# Patient Record
Sex: MALE | Race: WHITE | NOT HISPANIC OR LATINO | Employment: OTHER | URBAN - METROPOLITAN AREA
[De-identification: names, ages, dates, MRNs, and addresses within clinical notes are randomized per-mention and may not be internally consistent; named-entity substitution may affect disease eponyms.]

---

## 2024-03-12 ENCOUNTER — OFFICE VISIT (OUTPATIENT)
Dept: CARDIOLOGY | Facility: CLINIC | Age: 64
End: 2024-03-12
Payer: MEDICARE

## 2024-03-12 VITALS
DIASTOLIC BLOOD PRESSURE: 95 MMHG | TEMPERATURE: 96.9 F | HEIGHT: 72 IN | SYSTOLIC BLOOD PRESSURE: 161 MMHG | HEART RATE: 96 BPM | WEIGHT: 247.3 LBS | BODY MASS INDEX: 33.49 KG/M2

## 2024-03-12 DIAGNOSIS — I10 ESSENTIAL HYPERTENSION: ICD-10-CM

## 2024-03-12 DIAGNOSIS — E78.00 HYPERCHOLESTEREMIA: Primary | ICD-10-CM

## 2024-03-12 DIAGNOSIS — I25.10 CORONARY ARTERY DISEASE INVOLVING NATIVE CORONARY ARTERY OF NATIVE HEART WITHOUT ANGINA PECTORIS: ICD-10-CM

## 2024-03-12 PROCEDURE — 3080F DIAST BP >= 90 MM HG: CPT | Performed by: INTERNAL MEDICINE

## 2024-03-12 PROCEDURE — 99213 OFFICE O/P EST LOW 20 MIN: CPT | Performed by: INTERNAL MEDICINE

## 2024-03-12 PROCEDURE — 1036F TOBACCO NON-USER: CPT | Performed by: INTERNAL MEDICINE

## 2024-03-12 PROCEDURE — 3077F SYST BP >= 140 MM HG: CPT | Performed by: INTERNAL MEDICINE

## 2024-03-12 PROCEDURE — 1159F MED LIST DOCD IN RCRD: CPT | Performed by: INTERNAL MEDICINE

## 2024-03-12 PROCEDURE — 1160F RVW MEDS BY RX/DR IN RCRD: CPT | Performed by: INTERNAL MEDICINE

## 2024-03-12 RX ORDER — FLUTICASONE PROPIONATE 50 MCG
1 SPRAY, SUSPENSION (ML) NASAL
COMMUNITY
Start: 2016-07-11

## 2024-03-12 RX ORDER — ROSUVASTATIN CALCIUM 40 MG/1
TABLET, COATED ORAL
COMMUNITY
Start: 2020-09-11

## 2024-03-12 RX ORDER — ASPIRIN 81 MG/1
1 TABLET ORAL DAILY
COMMUNITY
Start: 2021-08-26

## 2024-03-12 RX ORDER — LOSARTAN POTASSIUM 25 MG/1
1 TABLET ORAL
COMMUNITY
Start: 2016-04-22 | End: 2024-03-12 | Stop reason: WASHOUT

## 2024-03-12 RX ORDER — ACETAMINOPHEN 325 MG/1
TABLET ORAL EVERY 6 HOURS
COMMUNITY
Start: 2021-08-26

## 2024-03-12 RX ORDER — FLUTICASONE PROPIONATE AND SALMETEROL 100; 50 UG/1; UG/1
POWDER RESPIRATORY (INHALATION)
COMMUNITY
Start: 2021-08-26

## 2024-03-12 RX ORDER — ALBUTEROL SULFATE 90 UG/1
AEROSOL, METERED RESPIRATORY (INHALATION)
COMMUNITY

## 2024-03-12 RX ORDER — LOSARTAN POTASSIUM 50 MG/1
50 TABLET ORAL DAILY
Qty: 30 TABLET | Refills: 11 | Status: SHIPPED | OUTPATIENT
Start: 2024-03-12 | End: 2024-05-28 | Stop reason: SDUPTHER

## 2024-03-12 NOTE — PROGRESS NOTES
Chief Complaint:   Coronary Artery Disease     History of Present Illness     Al Christine is a 63 y.o. male presenting with coronary artery disease.  He had a history of angina and cath with mild CAD managed medically.  The patient is tolerating guideline-directed medical therapy with antiplatelet and statin medication and is compliant.  The patient is active regularly and follows a heart healthy diet.  The patient has been well and is not having any anginal symptoms or dyspnea on exertion.      Review of Systems  All pertinent systems have been reviewed and are negative except for what is stated in the history of present illness.    All other systems have been reviewed and are negative and noncontributory to this patient's current ailments.     .       Previous History     Past Medical History:  He has a past medical history of Coronary artery disease involving native coronary artery of native heart without angina pectoris (03/12/2024), Essential hypertension (03/12/2024), Hypercholesteremia (03/12/2024), and Other specified health status.    Past Surgical History:  He has no past surgical history on file.      Social History:  He reports that he has quit smoking. His smoking use included cigarettes. He has never used smokeless tobacco. He reports current alcohol use. He reports that he does not use drugs.    Family History:  No family history on file.     Allergies:  Cat dander and Weed pollen    Outpatient Medications:  Current Outpatient Medications   Medication Instructions    acetaminophen (Tylenol) 325 mg tablet oral, Every 6 hours    albuterol 90 mcg/actuation inhaler inhale 2 puffs by mouth and INTO THE LUNGS every 4 hours if neede...  (REFER TO PRESCRIPTION NOTES).    aspirin 81 mg EC tablet 1 tablet, oral, Daily    fluticasone (Flonase) 50 mcg/actuation nasal spray 1 spray, Does not apply, Daily RT    fluticasone propion-salmeteroL (Advair Diskus) 100-50 mcg/dose diskus inhaler inhalation    losartan  (Cozaar) 25 mg tablet 1 tablet, oral, Daily RT    rosuvastatin (Crestor) 40 mg tablet oral       Physical Examination   Vitals:  Visit Vitals  BP (!) 161/95   Pulse 96   Temp 36.1 °C (96.9 °F)   Ht 1.829 m (6')   Wt 112 kg (247 lb 4.8 oz)   BMI 33.54 kg/m²   Smoking Status Former   BSA 2.39 m²      Physical Exam  Vitals reviewed.   Constitutional:       General: He is not in acute distress.     Appearance: Normal appearance.   HENT:      Head: Normocephalic and atraumatic.      Nose: Nose normal.   Eyes:      Conjunctiva/sclera: Conjunctivae normal.   Cardiovascular:      Rate and Rhythm: Normal rate and regular rhythm.      Pulses: Normal pulses.      Heart sounds: No murmur heard.  Pulmonary:      Effort: Pulmonary effort is normal. No respiratory distress.      Breath sounds: Normal breath sounds. No wheezing, rhonchi or rales.   Abdominal:      General: Bowel sounds are normal. There is no distension.      Palpations: Abdomen is soft.      Tenderness: There is no abdominal tenderness.   Musculoskeletal:         General: No swelling.      Right lower leg: No edema.      Left lower leg: No edema.   Skin:     General: Skin is warm and dry.      Capillary Refill: Capillary refill takes less than 2 seconds.   Neurological:      General: No focal deficit present.      Mental Status: He is alert.   Psychiatric:         Mood and Affect: Mood normal.            Labs/Imaging/Cardiac Studies     Last Labs:  CBC -  Lab Results   Component Value Date    WBC 6.1 03/30/2022    HGB 15.9 03/30/2022    HCT 47.2 03/30/2022    MCV 92 03/30/2022     03/30/2022       CMP -  Lab Results   Component Value Date    CALCIUM 9.0 06/17/2022    PROT 7.1 03/30/2022    ALBUMIN 4.6 03/30/2022    AST 16 03/30/2022    ALT 25 03/30/2022    ALKPHOS 57 03/30/2022    BILITOT 0.7 03/30/2022       LIPID PANEL -   Lab Results   Component Value Date    CHOL 189 03/30/2022    HDL 60.6 03/30/2022    CHHDL 3.1 03/30/2022    VLDL 23 03/30/2022    TRIG  "115 03/30/2022       RENAL FUNCTION PANEL -   Lab Results   Component Value Date    K 4.5 06/17/2022       No results found for: \"BNP\", \"HGBA1C\"    ECG:    Echo:  No echocardiogram results found for the past 12 months       Assessment and Recommendations     Assessment/Plan     1. Hypercholesteremia  LDL at goal for mild CAD.    2. Coronary artery disease involving native coronary artery of native heart without angina pectoris  CAD: The patient's CAD, as detailed in the HPI, has been clinically stable, without any anginal symptoms or dyspnea.  The patient will continue treatment with guideline-directed medical therapy with antiplatelet and statin medications.    3. Essential hypertension  Not well controlled.  Increase ARB.       Darek Pettit MD    Exclusive of any other services or procedures performed, I, Darek Pettit MD , spent 30 minutes in duration for this visit today.  This time consisted of chart review, obtaining history, and/or performing the exam as documented above as well as documenting the clinical information for the encounter in the electronic record, discussing treatment options, plans, and/or goals with patient, family, and/or caregiver, refilling medications, updating the electronic record, ordering medicines, lab work, imaging, referrals, and/or procedures as documented above and communicating with other [facility] professionals. I have discussed the results of laboratory, radiology, and cardiology studies with the patient and their family/caregiver.    "

## 2024-05-28 DIAGNOSIS — I25.10 CORONARY ARTERY DISEASE INVOLVING NATIVE CORONARY ARTERY OF NATIVE HEART WITHOUT ANGINA PECTORIS: ICD-10-CM

## 2024-05-28 DIAGNOSIS — E78.00 HYPERCHOLESTEREMIA: ICD-10-CM

## 2024-05-28 DIAGNOSIS — I10 ESSENTIAL HYPERTENSION: ICD-10-CM

## 2024-05-28 RX ORDER — LOSARTAN POTASSIUM 50 MG/1
50 TABLET ORAL DAILY
Qty: 90 TABLET | Refills: 2 | Status: SHIPPED | OUTPATIENT
Start: 2024-05-28 | End: 2025-05-28

## 2024-12-08 ENCOUNTER — APPOINTMENT (OUTPATIENT)
Dept: RADIOLOGY | Facility: HOSPITAL | Age: 64
End: 2024-12-08
Payer: MEDICARE

## 2024-12-08 ENCOUNTER — HOSPITAL ENCOUNTER (EMERGENCY)
Facility: HOSPITAL | Age: 64
Discharge: HOME | End: 2024-12-08
Payer: MEDICARE

## 2024-12-08 VITALS
WEIGHT: 230 LBS | OXYGEN SATURATION: 98 % | DIASTOLIC BLOOD PRESSURE: 68 MMHG | RESPIRATION RATE: 20 BRPM | TEMPERATURE: 98.6 F | BODY MASS INDEX: 31.15 KG/M2 | HEART RATE: 78 BPM | HEIGHT: 72 IN | SYSTOLIC BLOOD PRESSURE: 140 MMHG

## 2024-12-08 DIAGNOSIS — J01.10 ACUTE FRONTAL SINUSITIS, RECURRENCE NOT SPECIFIED: Primary | ICD-10-CM

## 2024-12-08 DIAGNOSIS — Q39.6 ESOPHAGEAL DIVERTICULUM: ICD-10-CM

## 2024-12-08 LAB
ALBUMIN SERPL BCP-MCNC: 4 G/DL (ref 3.4–5)
ALP SERPL-CCNC: 61 U/L (ref 33–136)
ALT SERPL W P-5'-P-CCNC: 13 U/L (ref 10–52)
ANION GAP SERPL CALC-SCNC: 12 MMOL/L (ref 10–20)
APPEARANCE UR: CLEAR
AST SERPL W P-5'-P-CCNC: 9 U/L (ref 9–39)
BASOPHILS # BLD AUTO: 0.04 X10*3/UL (ref 0–0.1)
BASOPHILS NFR BLD AUTO: 0.2 %
BILIRUB SERPL-MCNC: 1 MG/DL (ref 0–1.2)
BILIRUB UR STRIP.AUTO-MCNC: NEGATIVE MG/DL
BUN SERPL-MCNC: 15 MG/DL (ref 6–23)
CALCIUM SERPL-MCNC: 9.5 MG/DL (ref 8.6–10.3)
CHLORIDE SERPL-SCNC: 102 MMOL/L (ref 98–107)
CO2 SERPL-SCNC: 24 MMOL/L (ref 21–32)
COLOR UR: YELLOW
CREAT SERPL-MCNC: 0.84 MG/DL (ref 0.5–1.3)
CRP SERPL-MCNC: 17.13 MG/DL
EGFRCR SERPLBLD CKD-EPI 2021: >90 ML/MIN/1.73M*2
EOSINOPHIL # BLD AUTO: 0.01 X10*3/UL (ref 0–0.7)
EOSINOPHIL NFR BLD AUTO: 0.1 %
ERYTHROCYTE [DISTWIDTH] IN BLOOD BY AUTOMATED COUNT: 12.4 % (ref 11.5–14.5)
ERYTHROCYTE [SEDIMENTATION RATE] IN BLOOD BY WESTERGREN METHOD: 90 MM/H (ref 0–20)
FLUAV RNA RESP QL NAA+PROBE: NOT DETECTED
FLUBV RNA RESP QL NAA+PROBE: NOT DETECTED
GLUCOSE SERPL-MCNC: 119 MG/DL (ref 74–99)
GLUCOSE UR STRIP.AUTO-MCNC: NORMAL MG/DL
HCT VFR BLD AUTO: 46.1 % (ref 41–52)
HGB BLD-MCNC: 15.5 G/DL (ref 13.5–17.5)
HOLD SPECIMEN: NORMAL
IMM GRANULOCYTES # BLD AUTO: 0.09 X10*3/UL (ref 0–0.7)
IMM GRANULOCYTES NFR BLD AUTO: 0.5 % (ref 0–0.9)
KETONES UR STRIP.AUTO-MCNC: ABNORMAL MG/DL
LACTATE SERPL-SCNC: 0.6 MMOL/L (ref 0.4–2)
LEUKOCYTE ESTERASE UR QL STRIP.AUTO: NEGATIVE
LYMPHOCYTES # BLD AUTO: 0.81 X10*3/UL (ref 1.2–4.8)
LYMPHOCYTES NFR BLD AUTO: 4.8 %
MCH RBC QN AUTO: 31 PG (ref 26–34)
MCHC RBC AUTO-ENTMCNC: 33.6 G/DL (ref 32–36)
MCV RBC AUTO: 92 FL (ref 80–100)
MONOCYTES # BLD AUTO: 1.99 X10*3/UL (ref 0.1–1)
MONOCYTES NFR BLD AUTO: 11.8 %
MUCOUS THREADS #/AREA URNS AUTO: ABNORMAL /LPF
NEUTROPHILS # BLD AUTO: 13.9 X10*3/UL (ref 1.2–7.7)
NEUTROPHILS NFR BLD AUTO: 82.6 %
NITRITE UR QL STRIP.AUTO: NEGATIVE
NRBC BLD-RTO: 0 /100 WBCS (ref 0–0)
PH UR STRIP.AUTO: 6 [PH]
PLATELET # BLD AUTO: 229 X10*3/UL (ref 150–450)
POTASSIUM SERPL-SCNC: 4.1 MMOL/L (ref 3.5–5.3)
PROT SERPL-MCNC: 7.3 G/DL (ref 6.4–8.2)
PROT UR STRIP.AUTO-MCNC: ABNORMAL MG/DL
RBC # BLD AUTO: 5 X10*6/UL (ref 4.5–5.9)
RBC # UR STRIP.AUTO: NEGATIVE /UL
RBC #/AREA URNS AUTO: ABNORMAL /HPF
S PYO DNA THROAT QL NAA+PROBE: NOT DETECTED
SARS-COV-2 RNA RESP QL NAA+PROBE: NOT DETECTED
SODIUM SERPL-SCNC: 134 MMOL/L (ref 136–145)
SP GR UR STRIP.AUTO: >1.05
UROBILINOGEN UR STRIP.AUTO-MCNC: NORMAL MG/DL
WBC # BLD AUTO: 16.8 X10*3/UL (ref 4.4–11.3)
WBC #/AREA URNS AUTO: ABNORMAL /HPF

## 2024-12-08 PROCEDURE — 36415 COLL VENOUS BLD VENIPUNCTURE: CPT | Performed by: PHYSICIAN ASSISTANT

## 2024-12-08 PROCEDURE — 99285 EMERGENCY DEPT VISIT HI MDM: CPT | Mod: 25

## 2024-12-08 PROCEDURE — 71046 X-RAY EXAM CHEST 2 VIEWS: CPT

## 2024-12-08 PROCEDURE — 2550000001 HC RX 255 CONTRASTS: Performed by: PHYSICIAN ASSISTANT

## 2024-12-08 PROCEDURE — 2500000001 HC RX 250 WO HCPCS SELF ADMINISTERED DRUGS (ALT 637 FOR MEDICARE OP): Performed by: PHYSICIAN ASSISTANT

## 2024-12-08 PROCEDURE — 2500000004 HC RX 250 GENERAL PHARMACY W/ HCPCS (ALT 636 FOR OP/ED): Performed by: PHYSICIAN ASSISTANT

## 2024-12-08 PROCEDURE — 86140 C-REACTIVE PROTEIN: CPT | Performed by: PHYSICIAN ASSISTANT

## 2024-12-08 PROCEDURE — 71260 CT THORAX DX C+: CPT | Performed by: RADIOLOGY

## 2024-12-08 PROCEDURE — 96374 THER/PROPH/DIAG INJ IV PUSH: CPT

## 2024-12-08 PROCEDURE — 81001 URINALYSIS AUTO W/SCOPE: CPT | Performed by: PHYSICIAN ASSISTANT

## 2024-12-08 PROCEDURE — 85025 COMPLETE CBC W/AUTO DIFF WBC: CPT | Performed by: PHYSICIAN ASSISTANT

## 2024-12-08 PROCEDURE — 87636 SARSCOV2 & INF A&B AMP PRB: CPT | Performed by: PHYSICIAN ASSISTANT

## 2024-12-08 PROCEDURE — 71260 CT THORAX DX C+: CPT

## 2024-12-08 PROCEDURE — 80053 COMPREHEN METABOLIC PANEL: CPT | Performed by: PHYSICIAN ASSISTANT

## 2024-12-08 PROCEDURE — 71046 X-RAY EXAM CHEST 2 VIEWS: CPT | Performed by: RADIOLOGY

## 2024-12-08 PROCEDURE — 83605 ASSAY OF LACTIC ACID: CPT | Performed by: PHYSICIAN ASSISTANT

## 2024-12-08 PROCEDURE — 85652 RBC SED RATE AUTOMATED: CPT | Performed by: PHYSICIAN ASSISTANT

## 2024-12-08 PROCEDURE — 87651 STREP A DNA AMP PROBE: CPT | Performed by: PHYSICIAN ASSISTANT

## 2024-12-08 RX ORDER — MORPHINE SULFATE 2 MG/ML
2 INJECTION, SOLUTION INTRAMUSCULAR; INTRAVENOUS ONCE
Status: COMPLETED | OUTPATIENT
Start: 2024-12-08 | End: 2024-12-08

## 2024-12-08 RX ORDER — IBUPROFEN 400 MG/1
400 TABLET ORAL ONCE
Status: COMPLETED | OUTPATIENT
Start: 2024-12-08 | End: 2024-12-08

## 2024-12-08 RX ORDER — AMOXICILLIN AND CLAVULANATE POTASSIUM 875; 125 MG/1; MG/1
1 TABLET, FILM COATED ORAL ONCE
Status: COMPLETED | OUTPATIENT
Start: 2024-12-08 | End: 2024-12-08

## 2024-12-08 RX ORDER — ACETAMINOPHEN 325 MG/1
975 TABLET ORAL ONCE
Status: COMPLETED | OUTPATIENT
Start: 2024-12-08 | End: 2024-12-08

## 2024-12-08 RX ORDER — AMOXICILLIN AND CLAVULANATE POTASSIUM 875; 125 MG/1; MG/1
1 TABLET, FILM COATED ORAL EVERY 12 HOURS
Qty: 20 TABLET | Refills: 0 | Status: SHIPPED | OUTPATIENT
Start: 2024-12-08 | End: 2024-12-18

## 2024-12-08 ASSESSMENT — PAIN SCALES - GENERAL
PAINLEVEL_OUTOF10: 1
PAINLEVEL_OUTOF10: 4
PAINLEVEL_OUTOF10: 1
PAINLEVEL_OUTOF10: 8
PAINLEVEL_OUTOF10: 1

## 2024-12-08 ASSESSMENT — PAIN - FUNCTIONAL ASSESSMENT
PAIN_FUNCTIONAL_ASSESSMENT: 0-10

## 2024-12-08 ASSESSMENT — PAIN DESCRIPTION - PAIN TYPE: TYPE: ACUTE PAIN

## 2024-12-08 ASSESSMENT — COLUMBIA-SUICIDE SEVERITY RATING SCALE - C-SSRS
6. HAVE YOU EVER DONE ANYTHING, STARTED TO DO ANYTHING, OR PREPARED TO DO ANYTHING TO END YOUR LIFE?: NO
2. HAVE YOU ACTUALLY HAD ANY THOUGHTS OF KILLING YOURSELF?: NO
1. IN THE PAST MONTH, HAVE YOU WISHED YOU WERE DEAD OR WISHED YOU COULD GO TO SLEEP AND NOT WAKE UP?: NO

## 2024-12-08 ASSESSMENT — LIFESTYLE VARIABLES
HAVE PEOPLE ANNOYED YOU BY CRITICIZING YOUR DRINKING: NO
EVER HAD A DRINK FIRST THING IN THE MORNING TO STEADY YOUR NERVES TO GET RID OF A HANGOVER: NO
TOTAL SCORE: 0
HAVE YOU EVER FELT YOU SHOULD CUT DOWN ON YOUR DRINKING: NO
EVER FELT BAD OR GUILTY ABOUT YOUR DRINKING: NO

## 2024-12-08 ASSESSMENT — PAIN DESCRIPTION - LOCATION: LOCATION: CHEST

## 2024-12-08 NOTE — ED PROVIDER NOTES
HPI   Chief Complaint   Patient presents with    Flu Symptoms     Cough, body aches, fever, headaches for 6 days       History of present illness: 64-year-old male with history of coronary artery disease, hypertension, hypercholesterolemia complains of cough congestion and fever for the last 6 days.  Cough is occasionally productive of green sputum.  Has a headache in his frontal region that is associated.  Also complains of sore throat.  Painful to swallow but he is debility doing so.  Currently says his pain is 4 out of 10.  Pain is mostly in his throat and head but also has general myalgias.  He took ibuprofen with last night which offered some relief.  He is also taking some cough medicine.  Denies chest pain shortness of breath abdominal pain diarrhea constipation dysuria polyuria paresthesias incontinence difficulty ambulating.    Review of systems: Constitutional, eye, ENT, cardiovascular, respiratory, gastrointestinal, genitourinary, neurologic, musculoskeletal, dermatologic, hematologic, endocrine systems were evaluated and were negative unless otherwise specified in history of present illness.    Medications: Reviewed and per nursing note.    Family history: Denies relevant medical conditions.        Physical exam:    Appearance: Well-developed, well-nourished, nontoxic-appearing, alert and oriented x3. Talking in complete sentences.    HEENT: Inflamed nasal turbinates with rhinorrhea.  Frontal sinus tenderness.  Tonsillar erythema with no edema or exudates.  Uvula midline with no stridor drooling or trismus.  No induration of floor of mouth.  Head normocephalic atraumatic, extraocular movements intact, pupils equal round reactive to light, mucous membranes are moist and pink.  Partial cerumen impaction bilateral with normal tympanic membrane from visualized portion.    NECK:  Nml Inspection, no meningismus, no thyromegaly, no lymphadenopathy, no JVD, trachea is midline.    Respiratory: Clear to  auscultation bilaterally with normal bilateral excursion. No wheezes, rhonchi, crackles.    Cardiovascular: Regular rate and rhythm, no murmurs, rubs or gallops. Pulses 2+ symmetrically in the dorsalis pedis and radial pulses.    Abdomen/GI:  Soft, nontender, nondistended, normal bowel sounds x4. No masses or organomegaly.    :  No CVA tenderness    Neuro:  Oriented x 3, Speech Clear, cranial nerves grossly intact. Normal sensation to light touch in all 4 extremities.    Musculoskeletal: Patient spontaneously moves all 4 extremities.    Skin:  No cyanosis, clubbing, edema, open wounds, or rashes.            Patient History   Past Medical History:   Diagnosis Date    Coronary artery disease involving native coronary artery of native heart without angina pectoris 03/12/2024    Mild CAD by [facility] ([place] 2021)    Essential hypertension 03/12/2024    Hypercholesteremia 03/12/2024    Other specified health status     No pertinent past medical history     No past surgical history on file.  No family history on file.  Social History     Tobacco Use    Smoking status: Former     Types: Cigarettes    Smokeless tobacco: Never   Substance Use Topics    Alcohol use: Yes     Comment: rarely    Drug use: Never       Physical Exam   ED Triage Vitals [12/08/24 0756]   Temperature Heart Rate Respirations BP   36.6 °C (97.8 °F) 96 18 137/87      SpO2 Temp Source Heart Rate Source Patient Position   -- Temporal -- --      BP Location FiO2 (%)     -- --       Physical Exam      ED Course & MDM   Diagnoses as of 12/08/24 1312   Acute frontal sinusitis, recurrence not specified   Esophageal diverticulum                 No data recorded     Steve Coma Scale Score: 15 (12/08/24 0805 : Aydee Monique RN)                           Medical Decision Making  Labs Reviewed  CBC WITH AUTO DIFFERENTIAL - Abnormal     WBC                           16.8 (*)               nRBC                          0.0                    RBC                            5.00                   Hemoglobin                    15.5                   Hematocrit                    46.1                   MCV                           92                     MCH                           31.0                   MCHC                          33.6                   RDW                           12.4                   Platelets                     229                    Neutrophils %                 82.6                   Immature Granulocytes %, Automated   0.5                    Lymphocytes %                 4.8                    Monocytes %                   11.8                   Eosinophils %                 0.1                    Basophils %                   0.2                    Neutrophils Absolute          13.90 (*)               Immature Granulocytes Absolute, Au*   0.09                   Lymphocytes Absolute          0.81 (*)               Monocytes Absolute            1.99 (*)               Eosinophils Absolute          0.01                   Basophils Absolute            0.04                COMPREHENSIVE METABOLIC PANEL - Abnormal     Glucose                       119 (*)                Sodium                        134 (*)                Potassium                     4.1                    Chloride                      102                    Bicarbonate                   24                     Anion Gap                     12                     Urea Nitrogen                 15                     Creatinine                    0.84                   eGFR                          >90                    Calcium                       9.5                    Albumin                       4.0                    Alkaline Phosphatase          61                     Total Protein                 7.3                    AST                           9                      Bilirubin, Total              1.0                    ALT                           13                  C-REACTIVE  PROTEIN - Abnormal     C-Reactive Protein            17.13 (*)            SEDIMENTATION RATE, AUTOMATED - Abnormal     Sedimentation Rate            90 (*)              URINALYSIS WITH REFLEX CULTURE AND MICROSCOPIC - Abnormal     Color, Urine                  Yellow                 Appearance, Urine             Clear                  Specific Gravity, Urine       >1.050 (*)               pH, Urine                     6.0                    Protein, Urine                30 (1+) (*)               Glucose, Urine                Normal                 Blood, Urine                  NEGATIVE                Ketones, Urine                TRACE (*)               Bilirubin, Urine              NEGATIVE                Urobilinogen, Urine           Normal                 Nitrite, Urine                NEGATIVE                Leukocyte Esterase, Urine     NEGATIVE             URINALYSIS MICROSCOPIC WITH REFLEX CULTURE - Abnormal     WBC, Urine                    1-5                    RBC, Urine                    6-10 (*)               Mucus, Urine                  4+                  GROUP A STREPTOCOCCUS, PCR - Normal     Group A Strep PCR                                 INFLUENZA A AND B PCR - Normal     Flu A Result                                         Flu B Result                                             Narrative: This assay is an in vitro diagnostic multiplex nucleic acid amplification test for the detection and discrimination of Influenza A & B from nasopharyngeal specimens, and has been validated for use at [facility]. Negative results do not preclude Influenza A/B infections, and should not be used as the sole basis for diagnosis, treatment, or other management decisions. If Influenza A/B and RSV PCR results are negative, testing for Parainfluenza virus, Adenovirus and Metapneumovirus is routinely performed for [facility] [place] and [place] inpatients, and is available on other  patients by placing an add-on request.  SARS-COV-2 PCR - Normal     Coronavirus 2019, PCR                                    Narrative: This assay has received FDA Emergency Use Authorization (EUA) and is only authorized for the duration of time that circumstances exist to justify the authorization of the emergency use of in vitro diagnostic tests for the detection of SARS-CoV-2 virus and/or diagnosis of COVID-19 infection under section 564(b)(1) of the Act, 21 U.S.C. 360bbb-3(b)(1). This assay is an in vitro diagnostic nucleic acid amplification test for the qualitative detection of SARS-CoV-2 from nasopharyngeal specimens and has been validated for use at [facility]. Negative results do not preclude COVID-19 infections and should not be used as the sole basis for diagnosis, treatment, or other management decisions.                    LACTATE - Normal     Lactate                       0.6                        Narrative: Venipuncture immediately after or during the administration of Metamizole may lead to falsely low results. Testing should be performed immediately prior to Metamizole dosing.  URINALYSIS WITH REFLEX CULTURE AND MICROSCOPIC         Narrative: The following orders were created for panel order Urinalysis with Reflex Culture and Microscopic.                  Procedure                               Abnormality         Status                                     ---------                               -----------         ------                                     Urinalysis with Reflex C...[[id number]]  Abnormal            Final result                               Extra Urine Gray Tube[[id number]]                            In process                                                   Please view results for these tests on the individual orders.  EXTRA URINE GRAY TUBE    CT chest w IV contrast   Final Result    The air-fluid level in the right superior mediastinum on chest     radiograph performed today on 12/08/2024 is a right lateral    esophageal diverticulum. This has a thin imperceptible wall. This    deviates the trachea to the left.          MACRO:    None          Signed by: Nataliia Dos Santos 12/8/2024 11:30 AM    Dictation workstation:   [id number]     XR chest 2 views   Final Result    Soft tissue fullness in the medial right upper chest with air-fluid    level and possible tracheal deviation. Further evaluation with    contrast-enhanced chest CT recommended.          MACRO:    None.          Signed by: Nikki Pfeiffer 12/8/2024 9:07 AM    Dictation workstation:   [id number]         Patient complains of cough and congestion.  Differential diagnosis of COVID-19, influenza, pneumonia, otitis media, tonsillitis, meningitis, sinusitis.  Examination shows lungs clear to auscultation, normal tympanic membranes, no meningeal signs making pneumonia, otitis media, meningitis, less likely.  Has tonsillar erythema and frontal sinus tenderness.    Chest x-ray influenza COVID-19 swabs ordered.  Given tramadol and ibuprofen oral.    Chest x-ray shows questionable air-fluid level and deviation of trachea recommending CT scan.  Influenza COVID-negative.  Patient had some improvement of symptoms but still having some discomfort.    CBC CMP Urinalysis sed rate CRP lactate CT chest with contrast ordered.  Given morphine for his pain.    Diagnostic studies show inflammatory markers elevated with sedimentation rates 90, CRP 17, white blood cell count 16.8 with normal hemoglobin hematocrit.  Urinalysis shows trace ketones with increased specific gravity consistent with dehydration.  Chemistry shows glucose 119 sodium 134 electrolytes renal liver function normal otherwise.    CT chest shows air-fluid level in the right superior mediastinum and chest radiograph consistent with right lateral esophageal diverticulum which causes some deviation of the trachea to the left.  There is no surrounding  inflammation to suggest perforation.  There is no extraluminal air in mediastinum.  Presentation most consistent with esophageal diverticulum.  This does not appear to be acutely related to patient's condition with cough and congestion.  When asked has had symptoms of indigestion for years.  This may be impacting the positioning of the trachea little bit but there is no evidence of acute surgical emergency at this time.  Results were shared with patient's.    Patient has increased white blood cell count with cough congestion facial pressure and sinus tenderness.  His condition appears to be most consistent with a sinusitis.  Will initiate treatment with Augmentin.  Patient will need to follow-up with gastroenterology for evaluation of esophageal diverticulum.  Should anything change in his condition he is told to come back to emergency department.  Patient was given strict return precautions.    Patient will be discharged to home with prescription for Augmentin.  Patient is educated in signs and symptoms of worsening symptoms and reasons to come back to the emergency department.  Will need to follow up with gastroenterology.  Patient does not report social determinants of health impacting ability to obtain care that is needed.  Patient agrees with plan.    This is a transcription.  Text was reviewed for errors, but some transcription errors may remain.  Please call for any questions.          Procedure  Procedures     Fabricio Leavitt PA-C  12/08/24 1156

## 2024-12-10 ENCOUNTER — HOSPITAL ENCOUNTER (EMERGENCY)
Facility: HOSPITAL | Age: 64
Discharge: HOME | End: 2024-12-10
Attending: EMERGENCY MEDICINE
Payer: MEDICARE

## 2024-12-10 ENCOUNTER — APPOINTMENT (OUTPATIENT)
Dept: RADIOLOGY | Facility: HOSPITAL | Age: 64
End: 2024-12-10
Payer: MEDICARE

## 2024-12-10 ENCOUNTER — PHARMACY VISIT (OUTPATIENT)
Dept: PHARMACY | Facility: CLINIC | Age: 64
End: 2024-12-10
Payer: COMMERCIAL

## 2024-12-10 VITALS
HEIGHT: 72 IN | HEART RATE: 78 BPM | WEIGHT: 231 LBS | DIASTOLIC BLOOD PRESSURE: 86 MMHG | SYSTOLIC BLOOD PRESSURE: 137 MMHG | RESPIRATION RATE: 16 BRPM | TEMPERATURE: 97.7 F | OXYGEN SATURATION: 96 % | BODY MASS INDEX: 31.29 KG/M2

## 2024-12-10 DIAGNOSIS — R51.9 ACUTE NONINTRACTABLE HEADACHE, UNSPECIFIED HEADACHE TYPE: Primary | ICD-10-CM

## 2024-12-10 DIAGNOSIS — B34.9 VIRAL ILLNESS: ICD-10-CM

## 2024-12-10 PROCEDURE — 96375 TX/PRO/DX INJ NEW DRUG ADDON: CPT

## 2024-12-10 PROCEDURE — 70450 CT HEAD/BRAIN W/O DYE: CPT

## 2024-12-10 PROCEDURE — RXMED WILLOW AMBULATORY MEDICATION CHARGE

## 2024-12-10 PROCEDURE — 2500000004 HC RX 250 GENERAL PHARMACY W/ HCPCS (ALT 636 FOR OP/ED): Performed by: NURSE PRACTITIONER

## 2024-12-10 PROCEDURE — 96374 THER/PROPH/DIAG INJ IV PUSH: CPT

## 2024-12-10 PROCEDURE — 99284 EMERGENCY DEPT VISIT MOD MDM: CPT | Mod: 25 | Performed by: EMERGENCY MEDICINE

## 2024-12-10 PROCEDURE — 70450 CT HEAD/BRAIN W/O DYE: CPT | Performed by: STUDENT IN AN ORGANIZED HEALTH CARE EDUCATION/TRAINING PROGRAM

## 2024-12-10 PROCEDURE — 96361 HYDRATE IV INFUSION ADD-ON: CPT

## 2024-12-10 RX ORDER — FLUTICASONE PROPIONATE 50 MCG
1 SPRAY, SUSPENSION (ML) NASAL DAILY
Qty: 16 G | Refills: 0 | Status: SHIPPED | OUTPATIENT
Start: 2024-12-10 | End: 2025-01-09

## 2024-12-10 RX ORDER — METOCLOPRAMIDE HYDROCHLORIDE 5 MG/ML
10 INJECTION INTRAMUSCULAR; INTRAVENOUS ONCE
Status: COMPLETED | OUTPATIENT
Start: 2024-12-10 | End: 2024-12-10

## 2024-12-10 RX ORDER — DIPHENHYDRAMINE HYDROCHLORIDE 50 MG/ML
25 INJECTION INTRAMUSCULAR; INTRAVENOUS ONCE
Status: COMPLETED | OUTPATIENT
Start: 2024-12-10 | End: 2024-12-10

## 2024-12-10 RX ORDER — KETOROLAC TROMETHAMINE 30 MG/ML
15 INJECTION, SOLUTION INTRAMUSCULAR; INTRAVENOUS ONCE
Status: COMPLETED | OUTPATIENT
Start: 2024-12-10 | End: 2024-12-10

## 2024-12-10 RX ORDER — BENZONATATE 200 MG/1
200 CAPSULE ORAL 3 TIMES DAILY PRN
Qty: 15 CAPSULE | Refills: 0 | Status: SHIPPED | OUTPATIENT
Start: 2024-12-10 | End: 2024-12-15

## 2024-12-10 ASSESSMENT — PAIN DESCRIPTION - DESCRIPTORS: DESCRIPTORS: ACHING

## 2024-12-10 ASSESSMENT — COLUMBIA-SUICIDE SEVERITY RATING SCALE - C-SSRS
1. IN THE PAST MONTH, HAVE YOU WISHED YOU WERE DEAD OR WISHED YOU COULD GO TO SLEEP AND NOT WAKE UP?: NO
2. HAVE YOU ACTUALLY HAD ANY THOUGHTS OF KILLING YOURSELF?: NO
6. HAVE YOU EVER DONE ANYTHING, STARTED TO DO ANYTHING, OR PREPARED TO DO ANYTHING TO END YOUR LIFE?: NO

## 2024-12-10 ASSESSMENT — PAIN SCALES - GENERAL: PAINLEVEL_OUTOF10: 10 - WORST POSSIBLE PAIN

## 2024-12-10 ASSESSMENT — PAIN DESCRIPTION - LOCATION: LOCATION: HEAD

## 2024-12-10 ASSESSMENT — PAIN - FUNCTIONAL ASSESSMENT: PAIN_FUNCTIONAL_ASSESSMENT: 0-10

## 2024-12-10 ASSESSMENT — PAIN DESCRIPTION - PAIN TYPE: TYPE: ACUTE PAIN

## 2024-12-10 ASSESSMENT — PAIN DESCRIPTION - FREQUENCY: FREQUENCY: CONSTANT/CONTINUOUS

## 2024-12-10 NOTE — ED PROVIDER NOTES
HPI   Chief Complaint   Patient presents with    Flu Symptoms     Headache, sweating, cough.  Seen 2 days ago for this swabs found to be negative.         This is a 64-year-old  male, with a past medical history of CAD, hyperlipidemia, and hypertension, that is presenting to the emergency room with complaints of persistent headache and flulike symptoms.  The patient was seen in the emergency room 2 days ago after having a 6-day history of flulike symptoms.  The patient reports that he has been experiencing a headache that has been very difficult to go away.  He wakes up with it every morning.  Is mostly over the central portion of his scalp and along his forehead.  The patient was given antibiotics and has been taking Mucinex over-the-counter for congestion.  The patient reports that the pain is one of the worst headaches that he is ever had.  He is not experiencing any vision changes, speech changes, paresthesias, focal weakness, or gait disturbances.  Denies any facial droop.  His wife is concerned that possibly we missed sphenoid sinusitis.  Her son had similar complaints and they had a difficult time locating it.      History provided by:  Patient   used: No            Patient History   Past Medical History:   Diagnosis Date    Coronary artery disease involving native coronary artery of native heart without angina pectoris 03/12/2024    Mild CAD by [facility] (Scales 2021)    Essential hypertension 03/12/2024    Hypercholesteremia 03/12/2024    Other specified health status     No pertinent past medical history     History reviewed. No pertinent surgical history.  No family history on file.  Social History     Tobacco Use    Smoking status: Former     Types: Cigarettes    Smokeless tobacco: Never   Substance Use Topics    Alcohol use: Yes     Comment: rarely    Drug use: Never       Physical Exam   ED Triage Vitals [12/10/24 0955]   Temperature Heart Rate Respirations BP   36.5 °C (97.7 °F)  78 16 137/86      Pulse Ox Temp Source Heart Rate Source Patient Position   96 % Temporal -- --      BP Location FiO2 (%)     -- --       Physical Exam  HENT:      Head: Normocephalic and atraumatic.      Right Ear: A middle ear effusion is present.      Left Ear: Tympanic membrane normal.      Nose: Nose normal.      Mouth/Throat:      Pharynx: Oropharynx is clear.   Eyes:      Extraocular Movements: Extraocular movements intact.      Conjunctiva/sclera: Conjunctivae normal.      Pupils: Pupils are equal, round, and reactive to light.   Cardiovascular:      Rate and Rhythm: Normal rate and regular rhythm.      Pulses: Normal pulses.      Heart sounds: Normal heart sounds.   Pulmonary:      Effort: Pulmonary effort is normal.      Breath sounds: Normal breath sounds.   Abdominal:      General: Bowel sounds are normal.      Palpations: Abdomen is soft.   Musculoskeletal:         General: Normal range of motion.      Cervical back: Normal range of motion.   Lymphadenopathy:      Cervical: No cervical adenopathy.   Skin:     General: Skin is warm.      Capillary Refill: Capillary refill takes less than 2 seconds.   Neurological:      General: No focal deficit present.      Mental Status: He is alert.      GCS: GCS eye subscore is 4. GCS verbal subscore is 5. GCS motor subscore is 6.      Cranial Nerves: Cranial nerves 2-12 are intact.      Sensory: Sensation is intact.      Motor: Motor function is intact.      Coordination: Coordination is intact.      Gait: Gait is intact.      Deep Tendon Reflexes: Reflexes are normal and symmetric.           ED Course & MDM   Diagnoses as of 12/10/24 1359   Acute nonintractable headache, unspecified headache type   Viral illness                 No data recorded     [place] Coma Scale Score: 15 (12/10/24 0957 : Houston Springer RN)                           Medical Decision Making  The patient was seen and evaluated with the attending physician, Dr. Suh.  Patient is returning to  the emergency room with complaints of headache.  The patient is not displaying any acute neurological deficits.  Vital signs are stable.  Wife is concerned that he might be experiencing sinusitis, although the patient is currently on Augmentin therapy.  The patient does not have any evidence of otitis media, sinusitis, pharyngitis, or meningitis.  The patient did have some mild congestion noted behind the right tympanic membrane.  A CT of the head was obtained and showed no acute intercranial process.  No evidence of sinusitis.  A saline lock was established.  The patient was administered 1 L of normal saline, Benadryl, ketorolac, and Reglan.  The patient reported improvement of pain symptoms after medication administration.  The patient was advised to continue taking his antibiotics as directed.  He was provided prescription for Flonase for home administration.  He is to use Sudafed over-the-counter as needed.  The patient is to use Tylenol and/or Motrin over-the-counter as directed.  The patient was advised to provide symptomatic care.  The patient is to follow up with their primary care physician in the next 2-3 days.  The patient is to return to the ED worse in any way.  The patient was discharged in stable condition with computer discharge instructions given. Patient was agreeable with discharge planning.          Procedure  Procedures     LUCINDA Gallego-SHANE  12/10/24 1170       LUCINDA Gallego-SHANE  12/10/24 0526

## 2024-12-10 NOTE — PROGRESS NOTES
Medication Education     Medication education for Al Christine was provided to the patient  for the following medication(s):  Benzonatate  Fluticasone nasal spray      Medication education provided by a Pharmacist:  Dose, frequency, storage How to take and what to do if a dose is missed Benefits of taking the medication  Potential duration of therapy    Identified potential barriers to education:  None    Method(s) of Education:  Verbal    An opportunity to ask questions and receive answers was provided.     Assessment of understanding the patient :  2= meets goals/outcomes    Additional Notes (if applicable):     Tiarra Quispe

## 2025-01-10 ENCOUNTER — APPOINTMENT (OUTPATIENT)
Facility: CLINIC | Age: 65
End: 2025-01-10
Payer: MEDICARE

## 2025-01-10 VITALS
BODY MASS INDEX: 31.28 KG/M2 | DIASTOLIC BLOOD PRESSURE: 90 MMHG | SYSTOLIC BLOOD PRESSURE: 136 MMHG | HEART RATE: 70 BPM | HEIGHT: 73 IN | OXYGEN SATURATION: 92 % | WEIGHT: 236 LBS

## 2025-01-10 DIAGNOSIS — Z12.11 SCREENING FOR COLON CANCER: Primary | ICD-10-CM

## 2025-01-10 DIAGNOSIS — Q39.6 ESOPHAGEAL DIVERTICULUM: ICD-10-CM

## 2025-01-10 PROBLEM — J45.909 UNSPECIFIED ASTHMA, UNCOMPLICATED (HHS-HCC): Status: ACTIVE | Noted: 2022-09-21

## 2025-01-10 PROBLEM — I10 ESSENTIAL (PRIMARY) HYPERTENSION: Status: ACTIVE | Noted: 2022-09-21

## 2025-01-10 PROBLEM — M65.312 TRIGGER THUMB OF LEFT HAND: Status: ACTIVE | Noted: 2019-05-15

## 2025-01-10 PROBLEM — M19.90 UNSPECIFIED OSTEOARTHRITIS, UNSPECIFIED SITE: Status: ACTIVE | Noted: 2022-09-21

## 2025-01-10 PROBLEM — J20.9 BRONCHITIS, ACUTE: Status: ACTIVE | Noted: 2025-01-10

## 2025-01-10 PROBLEM — Z72.0 TOBACCO USE: Status: ACTIVE | Noted: 2022-09-21

## 2025-01-10 PROBLEM — Z86.0100 HISTORY OF COLONIC POLYPS: Status: ACTIVE | Noted: 2022-09-21

## 2025-01-10 PROBLEM — E78.5 HYPERLIPIDEMIA: Status: ACTIVE | Noted: 2022-09-21

## 2025-01-10 PROCEDURE — 3080F DIAST BP >= 90 MM HG: CPT | Performed by: INTERNAL MEDICINE

## 2025-01-10 PROCEDURE — 99204 OFFICE O/P NEW MOD 45 MIN: CPT | Performed by: INTERNAL MEDICINE

## 2025-01-10 PROCEDURE — 1159F MED LIST DOCD IN RCRD: CPT | Performed by: INTERNAL MEDICINE

## 2025-01-10 PROCEDURE — 3075F SYST BP GE 130 - 139MM HG: CPT | Performed by: INTERNAL MEDICINE

## 2025-01-10 PROCEDURE — 1160F RVW MEDS BY RX/DR IN RCRD: CPT | Performed by: INTERNAL MEDICINE

## 2025-01-10 PROCEDURE — 3008F BODY MASS INDEX DOCD: CPT | Performed by: INTERNAL MEDICINE

## 2025-01-10 NOTE — LETTER
"January 12, 2025     Fabricio Leavitt PA-C  [address]  [facility] [address]    Patient: Al Christine   YOB: 1960   Date of Visit: 1/10/2025       Dear Dr. Fabricio Leavitt PA-C:    Thank you for referring Al Christine to me for evaluation. Below are my notes for this consultation.  If you have questions, please do not hesitate to call me. I look forward to following your patient along with you.       Sincerely,     Augustin Chahal MD      CC: Graeme Wall MD  ______________________________________________________________________________________         [facility]    ASSESSMENT and PLAN:       Al Christine is a 64 y.o. male with a significant past medical history of CAD, HTN, and HLD who presents for consultation requested by the ER providers (Fabricio Leavitt PA-C) for the evaluation of an esophageal diverticulum.       Esophageal diverticulum  Large esophageal diverticulum noted on recent imaging. Imaging notes some tracheal deviation and \"thin/imperceptible wall\". He is relatively asymptomatic except for GERD, but these features are worrisome. With that will recommend thoracic surgery evaluation. May need additional work up (imaging and/or endoscopy), but would defer that to thoracic surgery. Would be concerned about risks of endoscopy/insufflation given the thin/imperceptible wall noted on imaging.  - refer to thoracic surgery      Colon polyps  Reported history of polyps with poor prep on his last colonoscopy and based on his recollection he is due for surveillance colonoscopy at this time. No reported difficulty with sedation and he is not on any anticoagulation except for low dose ASA.  - colonoscopy scheduled in endo  - OTC bowel prep discussed with patient  - instructions for procedure discussed with patient in the office today and hard copy given to patient today          Follow up with GI as needed.        Augustin Chahal MD        Senior Attending Physician, " Gastroenterology    [facility] [place] [facility]    Clinical   [facility] [employer]        Subjective   HISTORY OF PRESENT ILLNESS:     Chief Complaint  ER Follow-up    History Of Present Illness:    Al Christine is a 64 y.o. male with a significant past medical history of CAD, HTN, and HLD who presents for consultation requested by the ER providers (Fabricio Leavitt PA-C) for the evaluation of an esophageal diverticulum.       He was recently seen in the ER on 12/8/2024 after he had presented with congestion and flu-like symptoms. While there, imaging (CT) suggested an esophageal diverticulum (5 cm x 3 cm) with thin/imperceptible wall leading to trachial deviation.      He says that when he went to the ER with sinus congestion and a sore throat. He had not noted any new or worsening GI symptoms. He also denied any significant shortness of breath.    He is on Omeprazole (OTC) and he says that he will have heartburn a couple times per week. This is usually burning pain in his chest as well as reflux/regurgitation that is worse after eating tomato sauce or when eating close to bed time. He has not had any significant nausea or vomiting.      He says that he previously had a colonoscopy in the past where polyps were found. His most recent one was 2 years ago and he was told to have another one in a year because of a poor prep.      Patient denies any N/V, dysphagia, odynophagia, abdominal pain, diarrhea, constipation, hematemesis, hematochezia, melena, or weight loss.      Review of systems:   Review of Systems   Constitutional:  Negative for chills, fatigue, fever and unexpected weight change.   HENT:  Negative for congestion, sneezing, sore throat, trouble swallowing and voice change.    Respiratory:  Negative for cough, shortness of breath and wheezing.    Cardiovascular:  Negative for chest pain, palpitations and leg swelling.    Gastrointestinal:         As detailed above.   Genitourinary:  Negative for dysuria and hematuria.   Musculoskeletal:  Negative for arthralgias and myalgias.   Skin:  Negative for pallor and rash.   Neurological:  Negative for dizziness, seizures, syncope, weakness, numbness and headaches.   Hematological:  Negative for adenopathy. Does not bruise/bleed easily.   Psychiatric/Behavioral:  Negative for confusion. The patient is not nervous/anxious.    All other systems reviewed and are negative.      I performed a complete 10 point review of systems and it is negative except as noted in HPI or above.      PAST HISTORIES:       Past Medical History:  Patient Active Problem List   Diagnosis   • Coronary artery disease involving native coronary artery of native heart without angina pectoris   • Essential (primary) hypertension   • Hypercholesteremia   • Hyperlipidemia   • Bronchitis, acute   • Trigger thumb of left hand   • Unspecified osteoarthritis, unspecified site   • Tobacco use   • History of colonic polyps   • Unspecified asthma, uncomplicated ([facility])   • Esophageal diverticulum     He has a past medical history of Coronary artery disease involving native coronary artery of native heart without angina pectoris (03/12/2024), Essential hypertension (03/12/2024), Hypercholesteremia (03/12/2024), and Other specified health status.    Past Surgical History:  He has no past surgical history on file.      Social History:  He reports that he has quit smoking. His smoking use included cigarettes. He has never used smokeless tobacco. He reports current alcohol use. He reports that he does not use drugs.    Family History:  No known family history of GI disease, specifically denies any family history of pancreatitis, Crohn's, colon cancer, gastroesophageal cancer, or ulcerative colitis.    No family history on file.     Allergies:  Cat dander and Weed pollen      Objective   OBJECTIVE:       Last Recorded  "Vitals:  Vitals:    01/10/25 0949   BP: 136/90   Pulse: 70   SpO2: 92%   Weight: 107 kg (236 lb)   Height: 1.842 m (6' 0.5\")     /90   Pulse 70   Ht 1.842 m (6' 0.5\")   Wt 107 kg (236 lb)   SpO2 92%   BMI 31.57 kg/m²      Physical Exam:    Physical Exam  Vitals reviewed.   Constitutional:       General: He is not in acute distress.     Appearance: He is not ill-appearing.   HENT:      Head: Normocephalic and atraumatic.   Eyes:      General: No scleral icterus.  Cardiovascular:      Rate and Rhythm: Normal rate and regular rhythm.      Pulses: Normal pulses.      Heart sounds: Normal heart sounds. No murmur heard.  Pulmonary:      Effort: Pulmonary effort is normal. No respiratory distress.      Breath sounds: Normal breath sounds. No wheezing.   Abdominal:      General: Bowel sounds are normal.      Palpations: Abdomen is soft.      Tenderness: There is no abdominal tenderness. There is no rebound.   Musculoskeletal:         General: No swelling or deformity.   Skin:     General: Skin is warm and dry.      Coloration: Skin is not jaundiced.      Findings: No rash.   Neurological:      General: No focal deficit present.      Mental Status: He is alert and oriented to person, place, and time.   Psychiatric:         Mood and Affect: Mood normal.         Behavior: Behavior normal.         Thought Content: Thought content normal.         Judgment: Judgment normal.         Home Medications:  Prior to Admission medications    Medication Sig Start Date End Date Taking? Authorizing Provider   acetaminophen (Tylenol) 325 mg tablet Take by mouth every 6 hours. 8/26/21   Historical Provider, MD   albuterol 90 mcg/actuation inhaler inhale 2 puffs by mouth and INTO THE LUNGS every 4 hours if neede...  (REFER TO PRESCRIPTION NOTES).    Historical Provider, MD   aspirin 81 mg EC tablet Take 1 tablet (81 mg) by mouth once daily. 8/26/21   Historical Provider, MD   fluticasone (Flonase) 50 mcg/actuation nasal spray " Administer 1 spray into each nostril once daily. Shake gently. Before first use, prime pump. After use, clean tip and replace cap. 12/10/24 1/9/25  LUCINDA Gallego-CNP   fluticasone propion-salmeteroL (Advair Diskus) 100-50 mcg/dose diskus inhaler Inhale. 8/26/21   Historical Provider, MD   losartan (Cozaar) 50 mg tablet Take 1 tablet (50 mg) by mouth once daily. 5/28/24 5/28/25  Darek Pettit MD   rosuvastatin (Crestor) 40 mg tablet Take by mouth. 9/11/20   Historical Provider, MD         Relevant Results Recent labs reviewed in the EMR.    Lab Results   Component Value Date/Time    WBC 16.8 (H) 12/08/2024 1003    HGB 15.5 12/08/2024 1003    HGB 15.9 03/30/2022 1100    HGB 14.1 08/26/2021 1604    HGB 14.7 08/26/2021 0546    MCV 92 12/08/2024 1003     12/08/2024 1003     03/30/2022 1100     08/26/2021 1604     08/26/2021 0546       Lab Results   Component Value Date/Time     (L) 12/08/2024 1003    K 4.1 12/08/2024 1003     12/08/2024 1003    BUN 15 12/08/2024 1003    CREATININE 0.84 12/08/2024 1003    CREATININE 0.86 06/17/2022 0837    CREATININE 0.89 03/30/2022 1100       Lab Results   Component Value Date/Time    BILITOT 1.0 12/08/2024 1003    BILITOT 0.7 03/30/2022 1100    BILITOT 0.5 09/13/2021 0923    ALKPHOS 61 12/08/2024 1003    ALKPHOS 57 03/30/2022 1100    ALKPHOS 56 09/13/2021 0923    AST 9 12/08/2024 1003    AST 16 03/30/2022 1100    AST 21 09/13/2021 0923    ALT 13 12/08/2024 1003    ALT 25 03/30/2022 1100    ALT 37 09/13/2021 0923       Lab Results   Component Value Date/Time    CRP 17.13 (H) 12/08/2024 1003       Radiology: Imaging reviewed in the EMR.

## 2025-01-10 NOTE — PROGRESS NOTES
"    [facility] Gastroenterology    ASSESSMENT and PLAN:       Al Christine is a 64 y.o. male with a significant past medical history of CAD, HTN, and HLD who presents for consultation requested by the ER providers (Fabricio Leavitt PA-C) for the evaluation of an esophageal diverticulum.       Esophageal diverticulum  Large esophageal diverticulum noted on recent imaging. Imaging notes some tracheal deviation and \"thin/imperceptible wall\". He is relatively asymptomatic except for GERD, but these features are worrisome. With that will recommend thoracic surgery evaluation. May need additional work up (imaging and/or endoscopy), but would defer that to thoracic surgery. Would be concerned about risks of endoscopy/insufflation given the thin/imperceptible wall noted on imaging.  - refer to thoracic surgery      Colon polyps  Reported history of polyps with poor prep on his last colonoscopy and based on his recollection he is due for surveillance colonoscopy at this time. No reported difficulty with sedation and he is not on any anticoagulation except for low dose ASA.  - colonoscopy scheduled in endo  - OTC bowel prep discussed with patient  - instructions for procedure discussed with patient in the office today and hard copy given to patient today          Follow up with GI as needed.        Augustin Chahal MD        Senior Attending Physician, Gastroenterology    [facility] Digestive [facility]    Clinical   [employer] [facility] [employer]        Subjective   HISTORY OF PRESENT ILLNESS:     Chief Complaint  ER Follow-up    History Of Present Illness:    Al Christine is a 64 y.o. male with a significant past medical history of CAD, HTN, and HLD who presents for consultation requested by the ER providers (Fabricio Leavitt PA-C) for the evaluation of an esophageal diverticulum.       He was recently seen in the ER on 12/8/2024 after he had presented " with congestion and flu-like symptoms. While there, imaging (CT) suggested an esophageal diverticulum (5 cm x 3 cm) with thin/imperceptible wall leading to trachial deviation.      He says that when he went to the ER with sinus congestion and a sore throat. He had not noted any new or worsening GI symptoms. He also denied any significant shortness of breath.    He is on Omeprazole (OTC) and he says that he will have heartburn a couple times per week. This is usually burning pain in his chest as well as reflux/regurgitation that is worse after eating tomato sauce or when eating close to bed time. He has not had any significant nausea or vomiting.      He says that he previously had a colonoscopy in the past where polyps were found. His most recent one was 2 years ago and he was told to have another one in a year because of a poor prep.      Patient denies any N/V, dysphagia, odynophagia, abdominal pain, diarrhea, constipation, hematemesis, hematochezia, melena, or weight loss.      Review of systems:   Review of Systems   Constitutional:  Negative for chills, fatigue, fever and unexpected weight change.   HENT:  Negative for congestion, sneezing, sore throat, trouble swallowing and voice change.    Respiratory:  Negative for cough, shortness of breath and wheezing.    Cardiovascular:  Negative for chest pain, palpitations and leg swelling.   Gastrointestinal:         As detailed above.   Genitourinary:  Negative for dysuria and hematuria.   Musculoskeletal:  Negative for arthralgias and myalgias.   Skin:  Negative for pallor and rash.   Neurological:  Negative for dizziness, seizures, syncope, weakness, numbness and headaches.   Hematological:  Negative for adenopathy. Does not bruise/bleed easily.   Psychiatric/Behavioral:  Negative for confusion. The patient is not nervous/anxious.    All other systems reviewed and are negative.      I performed a complete 10 point review of systems and it is negative except as  "noted in HPI or above.      PAST HISTORIES:       Past Medical History:  Patient Active Problem List   Diagnosis    Coronary artery disease involving native coronary artery of native heart without angina pectoris    Essential (primary) hypertension    Hypercholesteremia    Hyperlipidemia    Bronchitis, acute    Trigger thumb of left hand    Unspecified osteoarthritis, unspecified site    Tobacco use    History of colonic polyps    Unspecified asthma, uncomplicated (HHS-HCC)    Esophageal diverticulum     He has a past medical history of Coronary artery disease involving native coronary artery of native heart without angina pectoris (03/12/2024), Essential hypertension (03/12/2024), Hypercholesteremia (03/12/2024), and Other specified health status.    Past Surgical History:  He has no past surgical history on file.      Social History:  He reports that he has quit smoking. His smoking use included cigarettes. He has never used smokeless tobacco. He reports current alcohol use. He reports that he does not use drugs.    Family History:  No known family history of GI disease, specifically denies any family history of pancreatitis, Crohn's, colon cancer, gastroesophageal cancer, or ulcerative colitis.    No family history on file.     Allergies:  Cat dander and Weed pollen      Objective   OBJECTIVE:       Last Recorded Vitals:  Vitals:    01/10/25 0949   BP: 136/90   Pulse: 70   SpO2: 92%   Weight: 107 kg (236 lb)   Height: 1.842 m (6' 0.5\")     /90   Pulse 70   Ht 1.842 m (6' 0.5\")   Wt 107 kg (236 lb)   SpO2 92%   BMI 31.57 kg/m²      Physical Exam:    Physical Exam  Vitals reviewed.   Constitutional:       General: He is not in acute distress.     Appearance: He is not ill-appearing.   HENT:      Head: Normocephalic and atraumatic.   Eyes:      General: No scleral icterus.  Cardiovascular:      Rate and Rhythm: Normal rate and regular rhythm.      Pulses: Normal pulses.      Heart sounds: Normal heart " sounds. No murmur heard.  Pulmonary:      Effort: Pulmonary effort is normal. No respiratory distress.      Breath sounds: Normal breath sounds. No wheezing.   Abdominal:      General: Bowel sounds are normal.      Palpations: Abdomen is soft.      Tenderness: There is no abdominal tenderness. There is no rebound.   Musculoskeletal:         General: No swelling or deformity.   Skin:     General: Skin is warm and dry.      Coloration: Skin is not jaundiced.      Findings: No rash.   Neurological:      General: No focal deficit present.      Mental Status: He is alert and oriented to person, place, and time.   Psychiatric:         Mood and Affect: Mood normal.         Behavior: Behavior normal.         Thought Content: Thought content normal.         Judgment: Judgment normal.         Home Medications:  Prior to Admission medications    Medication Sig Start Date End Date Taking? Authorizing Provider   acetaminophen (Tylenol) 325 mg tablet Take by mouth every 6 hours. 8/26/21   Historical Provider, MD   albuterol 90 mcg/actuation inhaler inhale 2 puffs by mouth and INTO THE LUNGS every 4 hours if neede...  (REFER TO PRESCRIPTION NOTES).    Historical Provider, MD   aspirin 81 mg EC tablet Take 1 tablet (81 mg) by mouth once daily. 8/26/21   Historical Provider, MD   fluticasone (Flonase) 50 mcg/actuation nasal spray Administer 1 spray into each nostril once daily. Shake gently. Before first use, prime pump. After use, clean tip and replace cap. 12/10/24 1/9/25  LUCINDA Gallego-CNP   fluticasone propion-salmeteroL (Advair Diskus) 100-50 mcg/dose diskus inhaler Inhale. 8/26/21   Historical Provider, MD   losartan (Cozaar) 50 mg tablet Take 1 tablet (50 mg) by mouth once daily. 5/28/24 5/28/25  Darek Pettit MD   rosuvastatin (Crestor) 40 mg tablet Take by mouth. 9/11/20   Historical Provider, MD         Relevant Results Recent labs reviewed in the EMR.    Lab Results   Component Value Date/Time    WBC 16.8 (H)  12/08/2024 1003    HGB 15.5 12/08/2024 1003    HGB 15.9 03/30/2022 1100    HGB 14.1 08/26/2021 1604    HGB 14.7 08/26/2021 0546    MCV 92 12/08/2024 1003     12/08/2024 1003     03/30/2022 1100     08/26/2021 1604     08/26/2021 0546       Lab Results   Component Value Date/Time     (L) 12/08/2024 1003    K 4.1 12/08/2024 1003     12/08/2024 1003    BUN 15 12/08/2024 1003    CREATININE 0.84 12/08/2024 1003    CREATININE 0.86 06/17/2022 0837    CREATININE 0.89 03/30/2022 1100       Lab Results   Component Value Date/Time    BILITOT 1.0 12/08/2024 1003    BILITOT 0.7 03/30/2022 1100    BILITOT 0.5 09/13/2021 0923    ALKPHOS 61 12/08/2024 1003    ALKPHOS 57 03/30/2022 1100    ALKPHOS 56 09/13/2021 0923    AST 9 12/08/2024 1003    AST 16 03/30/2022 1100    AST 21 09/13/2021 0923    ALT 13 12/08/2024 1003    ALT 25 03/30/2022 1100    ALT 37 09/13/2021 0923       Lab Results   Component Value Date/Time    CRP 17.13 (H) 12/08/2024 1003       Radiology: Imaging reviewed in the EMR.

## 2025-01-10 NOTE — PATIENT INSTRUCTIONS
I suggest that you talk to one of the thoracic surgeons about the diverticula in the esophagus. I have put in a referral and I suggest that you see Dr. Garza. You can call [phone] or the general scheduling line to schedule.      Colonoscopy  You have been scheduled for a colonoscopy.  You were given instructions for preparing for this test in the office today.  If you have questions about these instructions, please call my office at [phone].    After your procedure, you can expect me to talk to you to go over the results of the procedure. If polyps were removed I will usually be able to tell you my initial thoughts about those polyps and give you some idea of when you should have another colonoscopy.    If any polyps are removed during your procedure or if any biopsies are obtained those specimens will go to the pathologists to review under the microscope. Once those results are available they will be sent to me electronically to review. These results will also be available to you at that time through [employer]. I briefly review all of these results by the next business day to determine if there is any urgent information that needs to be communicated to you right away or anything that would significantly change what I told you at the time of the procedure. If there is nothing urgent this is usually a good sign and I will then do a more extensive review of the result and send you a letter with my final recommendations within a week of the result becoming available. If you have questions or need additional information I urge you to call the office at [phone], but I do ask for patience as I spend a good portion of each day performing procedures like the one you are scheduled for and during those times I am not able to take or return routine phone calls.    You were also given information regarding the schedule for your procedure including the time that you need to arrive to the endoscopy unit.  You will  also be contacted 2-3 day prior to your procedure to confirm the final arrival time.  If you have questions about this or if you need to cancel or change this appointment please call my office at [phone].         Follow up with GI as needed.

## 2025-01-12 ASSESSMENT — ENCOUNTER SYMPTOMS
TROUBLE SWALLOWING: 0
ADENOPATHY: 0
FATIGUE: 0
HEADACHES: 0
WEAKNESS: 0
NERVOUS/ANXIOUS: 0
FEVER: 0
CHILLS: 0
DIZZINESS: 0
BRUISES/BLEEDS EASILY: 0
NUMBNESS: 0
HEMATURIA: 0
SEIZURES: 0
SORE THROAT: 0
CONFUSION: 0
WHEEZING: 0
COUGH: 0
ARTHRALGIAS: 0
ROS GI COMMENTS: AS DETAILED ABOVE.
UNEXPECTED WEIGHT CHANGE: 0
DYSURIA: 0
MYALGIAS: 0
SHORTNESS OF BREATH: 0
VOICE CHANGE: 0
PALPITATIONS: 0

## 2025-01-23 ENCOUNTER — APPOINTMENT (OUTPATIENT)
Dept: GASTROENTEROLOGY | Facility: HOSPITAL | Age: 65
End: 2025-01-23
Payer: MEDICARE

## 2025-02-05 ENCOUNTER — OFFICE VISIT (OUTPATIENT)
Dept: SURGERY | Facility: HOSPITAL | Age: 65
End: 2025-02-05
Payer: MEDICARE

## 2025-02-05 VITALS
OXYGEN SATURATION: 96 % | BODY MASS INDEX: 31.42 KG/M2 | WEIGHT: 234.9 LBS | TEMPERATURE: 97.7 F | SYSTOLIC BLOOD PRESSURE: 120 MMHG | DIASTOLIC BLOOD PRESSURE: 76 MMHG | HEART RATE: 76 BPM

## 2025-02-05 DIAGNOSIS — Q39.6 ESOPHAGEAL DIVERTICULUM: ICD-10-CM

## 2025-02-05 DIAGNOSIS — K22.9 ESOPHAGEAL ABNORMALITY: ICD-10-CM

## 2025-02-05 PROCEDURE — 99205 OFFICE O/P NEW HI 60 MIN: CPT | Performed by: THORACIC SURGERY (CARDIOTHORACIC VASCULAR SURGERY)

## 2025-02-05 PROCEDURE — 99215 OFFICE O/P EST HI 40 MIN: CPT | Performed by: THORACIC SURGERY (CARDIOTHORACIC VASCULAR SURGERY)

## 2025-02-05 PROCEDURE — 3074F SYST BP LT 130 MM HG: CPT | Performed by: THORACIC SURGERY (CARDIOTHORACIC VASCULAR SURGERY)

## 2025-02-05 PROCEDURE — 3078F DIAST BP <80 MM HG: CPT | Performed by: THORACIC SURGERY (CARDIOTHORACIC VASCULAR SURGERY)

## 2025-02-05 PROCEDURE — 1126F AMNT PAIN NOTED NONE PRSNT: CPT | Performed by: THORACIC SURGERY (CARDIOTHORACIC VASCULAR SURGERY)

## 2025-02-05 ASSESSMENT — PAIN SCALES - GENERAL: PAINLEVEL_OUTOF10: 0-NO PAIN

## 2025-02-05 NOTE — PROGRESS NOTES
HPI:   Al Christine is a 64 y.o.male referred with an air-fluid level lateral to the upper thoracic esophagus.  He presented with headaches and flulike symptoms on December 8 and was seen in the  [place] emergency room.  He underwent a head CT and chest CT.  The chest CT showed air-fluid level in the right superior mediastinum adjacent to the esophagus.  Since that time his headaches and fevers have resolved completely.  He denies any dysphagia.  He denies any odynophagia.  He has no other symptoms related to this condition    Past Medical History:   Diagnosis Date    Coronary artery disease involving native coronary artery of native heart without angina pectoris 03/12/2024    Mild CAD by [facility] (Scales 2021)    Essential hypertension 03/12/2024    Hypercholesteremia 03/12/2024    Other specified health status     No pertinent past medical history          Current Outpatient Medications:     acetaminophen (Tylenol) 325 mg tablet, Take by mouth every 6 hours., Disp: , Rfl:     albuterol 90 mcg/actuation inhaler, inhale 2 puffs by mouth and INTO THE LUNGS every 4 hours if neede...  (REFER TO PRESCRIPTION NOTES)., Disp: , Rfl:     aspirin 81 mg EC tablet, Take 1 tablet (81 mg) by mouth once daily., Disp: , Rfl:     fluticasone (Flonase) 50 mcg/actuation nasal spray, Administer 1 spray into each nostril once daily. Shake gently. Before first use, prime pump. After use, clean tip and replace cap., Disp: 16 g, Rfl: 0    fluticasone propion-salmeteroL (Advair Diskus) 100-50 mcg/dose diskus inhaler, Inhale., Disp: , Rfl:     losartan (Cozaar) 50 mg tablet, Take 1 tablet (50 mg) by mouth once daily., Disp: 90 tablet, Rfl: 2    rosuvastatin (Crestor) 40 mg tablet, Take by mouth., Disp: , Rfl:     PSHx:  SHx: ex smoker denies ETOH, or illicit drugs   FMHx: negative for history of thoracic cancer     ROS  General: negative for fever, chills, weight loss, night sweat  Head: negative for severe headache, vision change, blurred  vision,   CV: negative for chest pain, dizziness, lightheadedness   Pulm: negative for shortness of breath, dyspnea on exertion, hemoptysis  GI: negative for diarrhea, constipation, abdominal pain, nausea or vomiting, BRBPR  : negative for dysuria, hematuria, incontinence  Skin: negative for rash  Heme: negative for blood thinner, bleeding disorder or clotting disorder  Endo: negative for heat or cold intolerance, weight gain or weight loss  MSK: negative for rash, edema, weakness    PHYSICAL EXAM  Constitution: well-developed well-nourished in no acute distress  HEENT: NCAT, moist mucosal membrane, neck supple, no crepitus, sclera anicteric  Lymph nodes: no cervical or supraclavicular lymphadenopathy  Cardiac: RRR, normal S1, S2, no mrg  Pulmonary: normal air movement, CTAP, no wcr  Abdomen: soft, non-distended, non-tender, no rigidity, guarding or rebound tenderness, no splenohepatomegaly  Neuro: AOx3, CNII-XII grossly normal  Ext: warm, dry, no edema noted  Skin: dry, clean and intact  Psych: mood and affect wnl    Results    I looked at his chest CT with IV contrast from December 8, 2024 shows an air-fluid level in the upper mediastinum.    Assessment and Plan:    This is a 64-year-old male with an air-fluid level adjacent to the esophagus.  A diverticulum in this area would be very unusual.  We will be ordering an esophagram.  I would likely then perform an endoscopy in order to better visualize the esophagus.        Nolan Garza MD  Chief Division of Thoracic and Esophageal Surgery    [facility]   Co-Director, Thoracic Oncology Program    [facility]  Professor of Surgery    [facility] [employer] [facility] [employer]  Office phone: [phone]  Fax: [phone]

## 2025-02-07 ENCOUNTER — PREP FOR PROCEDURE (OUTPATIENT)
Facility: CLINIC | Age: 65
End: 2025-02-07
Payer: MEDICARE

## 2025-02-10 ENCOUNTER — ANESTHESIA EVENT (OUTPATIENT)
Dept: GASTROENTEROLOGY | Facility: HOSPITAL | Age: 65
End: 2025-02-10
Payer: MEDICARE

## 2025-02-10 ENCOUNTER — ANESTHESIA (OUTPATIENT)
Dept: GASTROENTEROLOGY | Facility: HOSPITAL | Age: 65
End: 2025-02-10
Payer: MEDICARE

## 2025-02-10 ENCOUNTER — HOSPITAL ENCOUNTER (OUTPATIENT)
Dept: GASTROENTEROLOGY | Facility: HOSPITAL | Age: 65
Discharge: HOME | End: 2025-02-10
Payer: MEDICARE

## 2025-02-10 VITALS
BODY MASS INDEX: 31.97 KG/M2 | OXYGEN SATURATION: 95 % | HEART RATE: 64 BPM | HEIGHT: 72 IN | WEIGHT: 236 LBS | RESPIRATION RATE: 16 BRPM | DIASTOLIC BLOOD PRESSURE: 72 MMHG | SYSTOLIC BLOOD PRESSURE: 125 MMHG | TEMPERATURE: 97.4 F

## 2025-02-10 DIAGNOSIS — Z12.11 SCREENING FOR COLON CANCER: ICD-10-CM

## 2025-02-10 PROCEDURE — 7100000009 HC PHASE TWO TIME - INITIAL BASE CHARGE

## 2025-02-10 PROCEDURE — 45385 COLONOSCOPY W/LESION REMOVAL: CPT | Performed by: INTERNAL MEDICINE

## 2025-02-10 PROCEDURE — 2500000004 HC RX 250 GENERAL PHARMACY W/ HCPCS (ALT 636 FOR OP/ED): Performed by: NURSE ANESTHETIST, CERTIFIED REGISTERED

## 2025-02-10 PROCEDURE — 3700000001 HC GENERAL ANESTHESIA TIME - INITIAL BASE CHARGE

## 2025-02-10 PROCEDURE — 7100000010 HC PHASE TWO TIME - EACH INCREMENTAL 1 MINUTE

## 2025-02-10 PROCEDURE — 3700000002 HC GENERAL ANESTHESIA TIME - EACH INCREMENTAL 1 MINUTE

## 2025-02-10 RX ORDER — PROPOFOL 10 MG/ML
INJECTION, EMULSION INTRAVENOUS AS NEEDED
Status: DISCONTINUED | OUTPATIENT
Start: 2025-02-10 | End: 2025-02-10

## 2025-02-10 RX ORDER — SODIUM CHLORIDE 0.9 % (FLUSH) 0.9 %
SYRINGE (ML) INJECTION AS NEEDED
Status: DISCONTINUED | OUTPATIENT
Start: 2025-02-10 | End: 2025-02-10

## 2025-02-10 RX ORDER — LIDOCAINE HCL/PF 100 MG/5ML
SYRINGE (ML) INTRAVENOUS AS NEEDED
Status: DISCONTINUED | OUTPATIENT
Start: 2025-02-10 | End: 2025-02-10

## 2025-02-10 RX ADMIN — LIDOCAINE HYDROCHLORIDE 100 MG: 20 INJECTION, SOLUTION INTRAVENOUS at 10:22

## 2025-02-10 RX ADMIN — Medication 5 ML: at 10:39

## 2025-02-10 RX ADMIN — PROPOFOL 200 MG: 10 INJECTION, EMULSION INTRAVENOUS at 10:22

## 2025-02-10 SDOH — HEALTH STABILITY: MENTAL HEALTH: CURRENT SMOKER: 0

## 2025-02-10 ASSESSMENT — PAIN SCALES - GENERAL
PAINLEVEL_OUTOF10: 0 - NO PAIN

## 2025-02-10 ASSESSMENT — PAIN - FUNCTIONAL ASSESSMENT
PAIN_FUNCTIONAL_ASSESSMENT: 0-10

## 2025-02-10 ASSESSMENT — COLUMBIA-SUICIDE SEVERITY RATING SCALE - C-SSRS
1. IN THE PAST MONTH, HAVE YOU WISHED YOU WERE DEAD OR WISHED YOU COULD GO TO SLEEP AND NOT WAKE UP?: NO
6. HAVE YOU EVER DONE ANYTHING, STARTED TO DO ANYTHING, OR PREPARED TO DO ANYTHING TO END YOUR LIFE?: NO
2. HAVE YOU ACTUALLY HAD ANY THOUGHTS OF KILLING YOURSELF?: NO

## 2025-02-10 NOTE — H&P
Pre-sedation Evaluation:  Sedation Necessary For: Analgesia  Sedation to be Managed By: Anesthesia (Monitored Anesthesia Care/MAC)    History of Present Illness and Indication for Procedure      Al Christine is a 64 y.o. male with a history of CAD, HTN, and HLD who presents for screening colonoscopy.     He says that he previously had a colonoscopy in the past where polyps were found. His most recent one was 2 years ago and he was told to have another one in a year because of a poor prep.    There is no family history of colorectal cancer.    he has taken Aspirin (81 mg daily) with the last dose 1 days prior to the procedure.      NPO guidelines met: Yes         Review of Systems  Constitutional:  Negative for chills, fever and unexpected weight change.   HENT:  Negative for trouble swallowing.    Respiratory:  Negative for shortness of breath.    Cardiovascular:  Negative for chest pain.   Gastrointestinal:  As above.   Skin:  Negative for color change.       I performed a complete 10 point review of systems and it is negative except as noted in HPI or above. All other systems have been reviewed and are negative.      Patient Active Problem List   Diagnosis    Coronary artery disease involving native coronary artery of native heart without angina pectoris    Essential (primary) hypertension    Hypercholesteremia    Hyperlipidemia    Bronchitis, acute    Trigger thumb of left hand    Unspecified osteoarthritis, unspecified site    Tobacco use    History of colonic polyps    Unspecified asthma, uncomplicated (HHS-HCC)    Esophageal diverticulum       Past Medical History:  He has a past medical history of Asthma, Coronary artery disease involving native coronary artery of native heart without angina pectoris (03/12/2024), Essential hypertension (03/12/2024), Hypercholesteremia (03/12/2024), and Other specified health status.    Past Surgical History:  He has no past surgical history on file.      Social  History:  He reports that he has quit smoking. His smoking use included cigarettes. He has never used smokeless tobacco. He reports current alcohol use. He reports that he does not use drugs.    Family History:  No family history on file.     Allergies:  Cat dander and Weed pollen    Current Medications  Current Outpatient Medications on File Prior to Encounter   Medication Sig Dispense Refill    acetaminophen (Tylenol) 325 mg tablet Take by mouth every 6 hours.      albuterol 90 mcg/actuation inhaler inhale 2 puffs by mouth and INTO THE LUNGS every 4 hours if neede...  (REFER TO PRESCRIPTION NOTES).      aspirin 81 mg EC tablet Take 1 tablet (81 mg) by mouth once daily.      fluticasone (Flonase) 50 mcg/actuation nasal spray Administer 1 spray into each nostril once daily. Shake gently. Before first use, prime pump. After use, clean tip and replace cap. 16 g 0    fluticasone propion-salmeteroL (Advair Diskus) 100-50 mcg/dose diskus inhaler Inhale. (Patient not taking: Reported on 2/10/2025)      losartan (Cozaar) 50 mg tablet Take 1 tablet (50 mg) by mouth once daily. 90 tablet 2    rosuvastatin (Crestor) 40 mg tablet Take by mouth.       No current facility-administered medications on file prior to encounter.         Last Recorded Vitals  There were no vitals taken for this visit.      Physical Exam  Vitals reviewed.   Constitutional:       General: He is not in acute distress.     Appearance: He is not ill-appearing.   HENT:      Head: Normocephalic and atraumatic.      Mouth/Throat:      Comments: Mallampati: II  Cardiovascular:      Rate and Rhythm: Normal rate and regular rhythm.      Pulses: Normal pulses.      Heart sounds: Normal heart sounds. No murmur heard.  Pulmonary:      Effort: Pulmonary effort is normal. No respiratory distress.   Abdominal:      General: Bowel sounds are normal.      Palpations: Abdomen is soft.      Tenderness: There is no abdominal tenderness.   Skin:     General: Skin is warm  and dry.   Neurological:      General: No focal deficit present.      Mental Status: He is alert and oriented to person, place, and time.              Assessment/Plan     Colonoscopy in endo with MAC sedation, ASA 2        Level of Sedation: Moderate Sedation  (Sedation medications to be delivered via monitored anesthesia care (MAC).     This evaluation serves as my H&P.     Outpatient medication list and allergies have been reviewed.  Pre-procedure/rupal procedure antibiotics not needed.     Pre-procedure evaluation completed by physician.           Augustin Chahal MD

## 2025-02-10 NOTE — ANESTHESIA POSTPROCEDURE EVALUATION
Patient: Al Christine    Procedure Summary       Date: 02/10/25 Room / Location: [facility]    Anesthesia Start: 1015 Anesthesia Stop: 1049    Procedure: COLONOSCOPY Diagnosis: Screening for colon cancer    Scheduled Providers: Augustin Chahal MD Responsible Provider: XOCHITL Benoit    Anesthesia Type: MAC ASA Status: 3            Anesthesia Type: MAC    Vitals Value Taken Time   /72 02/10/25 1104   Temp 36.3 °C (97.4 °F) 02/10/25 1104   Pulse 64 02/10/25 1104   Resp 16 02/10/25 1104   SpO2 95 % 02/10/25 1104       Anesthesia Post Evaluation    Patient location during evaluation: bedside  Patient participation: complete - patient participated  Level of consciousness: awake  Pain management: adequate  Airway patency: patent  Cardiovascular status: acceptable  Respiratory status: acceptable  Hydration status: acceptable  Postoperative Nausea and Vomiting: none    There were no known notable events for this encounter.

## 2025-02-10 NOTE — ANESTHESIA PREPROCEDURE EVALUATION
Patient: Al Christine    Procedure Information       Date/Time: 02/10/25 1030    Scheduled providers: Augustin Chahal MD    Procedure: COLONOSCOPY    Location: [facility]            Relevant Problems   Anesthesia (within normal limits)      Cardiac   (+) Coronary artery disease involving native coronary artery of native heart without angina pectoris   (+) Essential (primary) hypertension   (+) Hypercholesteremia   (+) Hyperlipidemia      Pulmonary   (+) Unspecified asthma, uncomplicated (HHS-HCC)      Musculoskeletal   (+) Unspecified osteoarthritis, unspecified site       Clinical information reviewed:   Tobacco  Allergies  Meds   Med Hx  Surg Hx   Fam Hx  Soc Hx        NPO Detail:  NPO/Void Status  Date of Last Liquid: 02/10/25  Time of Last Liquid: 0400  Date of Last Solid: 02/08/25  Time of Last Solid: 1800  Last Intake Type: Clear fluids         Physical Exam    Airway  Mallampati: II     Cardiovascular - normal exam     Dental    Pulmonary - normal exam     Abdominal            Anesthesia Plan    History of general anesthesia?: yes  History of complications of general anesthesia?: no    ASA 3     MAC     The patient is not a current smoker.    Anesthetic plan and risks discussed with patient.  Use of blood products discussed with who consented to blood products.

## 2025-02-11 NOTE — ADDENDUM NOTE
Encounter addended by: Flor Darden RN on: 2/11/2025 9:55 AM   Actions taken: Contacts section saved, Flowsheet accepted

## 2025-02-12 ENCOUNTER — HOSPITAL ENCOUNTER (OUTPATIENT)
Dept: RADIOLOGY | Facility: HOSPITAL | Age: 65
Discharge: HOME | End: 2025-02-12
Payer: MEDICARE

## 2025-02-12 DIAGNOSIS — K22.9 ESOPHAGEAL ABNORMALITY: ICD-10-CM

## 2025-02-12 PROCEDURE — 2500000005 HC RX 250 GENERAL PHARMACY W/O HCPCS: Performed by: THORACIC SURGERY (CARDIOTHORACIC VASCULAR SURGERY)

## 2025-02-12 PROCEDURE — 74220 X-RAY XM ESOPHAGUS 1CNTRST: CPT

## 2025-02-12 RX ADMIN — BARIUM SULFATE 120 ML: 0.6 SUSPENSION ORAL at 09:17

## 2025-02-14 LAB
LABORATORY COMMENT REPORT: NORMAL
PATH REPORT.FINAL DX SPEC: NORMAL
PATH REPORT.GROSS SPEC: NORMAL
PATH REPORT.RELEVANT HX SPEC: NORMAL
PATH REPORT.TOTAL CANCER: NORMAL
RESIDENT REVIEW: NORMAL